# Patient Record
Sex: MALE | Race: WHITE
[De-identification: names, ages, dates, MRNs, and addresses within clinical notes are randomized per-mention and may not be internally consistent; named-entity substitution may affect disease eponyms.]

---

## 2019-08-12 ENCOUNTER — HOSPITAL ENCOUNTER (OUTPATIENT)
Dept: HOSPITAL 75 - RAD | Age: 64
End: 2019-08-12
Attending: PHYSICIAN ASSISTANT
Payer: COMMERCIAL

## 2019-08-12 DIAGNOSIS — M48.02: Primary | ICD-10-CM

## 2019-08-12 DIAGNOSIS — Z98.1: ICD-10-CM

## 2019-08-12 DIAGNOSIS — M47.812: ICD-10-CM

## 2019-08-12 PROCEDURE — 72125 CT NECK SPINE W/O DYE: CPT

## 2019-08-12 NOTE — DIAGNOSTIC IMAGING REPORT
PROCEDURE: CT cervical spine without contrast.



TECHNIQUE: Multiple contiguous axial images were obtained through

the cervical spine without the use of intravenous contrast.

Sagittal and coronal reformations were then performed. Auto

Exposure Controls were utilized during the CT exam to meet ALARA

standards for radiation dose reduction. 



INDICATION: Neck surgery.



FINDINGS: Postop changes of ACDF with anterior plate and screws

transfixing the C4-C6 levels are noted. The hardware appears to

be intact. No fracture or loosening is seen. Alignment is normal.

There is multilevel degenerative disc disease with variable disc

space narrowing and marginal spurring. Mild neuroforaminal

narrowing at C3-C4 level is seen on the right due to

uncovertebral joint degenerative change. Moderate bilateral

neuroforaminal narrowing at C4-C5 and C5-C6 levels is noted.

There is mild to moderate bilateral neuroforaminal narrowing at

C6-C7 levels. No acute bony abnormality is identified. Odontoid

is intact.



IMPRESSION: Postop changes of ACDF at C4-C6. No hardware fracture

or loosening is identified. There is generalized cervical

spondylosis and neuroforaminal narrowing described level by level

above.



Dictated by: 



  Dictated on workstation # [id number]